# Patient Record
Sex: MALE | Race: OTHER | URBAN - METROPOLITAN AREA
[De-identification: names, ages, dates, MRNs, and addresses within clinical notes are randomized per-mention and may not be internally consistent; named-entity substitution may affect disease eponyms.]

---

## 2019-07-31 ENCOUNTER — OUTPATIENT (OUTPATIENT)
Dept: OUTPATIENT SERVICES | Facility: HOSPITAL | Age: 44
LOS: 1 days | Discharge: HOME | End: 2019-07-31
Payer: OTHER MISCELLANEOUS

## 2019-07-31 DIAGNOSIS — M54.5 LOW BACK PAIN: ICD-10-CM

## 2019-07-31 DIAGNOSIS — M54.2 CERVICALGIA: ICD-10-CM

## 2019-07-31 PROCEDURE — 72148 MRI LUMBAR SPINE W/O DYE: CPT | Mod: 26

## 2019-07-31 PROCEDURE — 72141 MRI NECK SPINE W/O DYE: CPT | Mod: 26

## 2022-07-05 ENCOUNTER — APPOINTMENT (OUTPATIENT)
Dept: PAIN MANAGEMENT | Facility: CLINIC | Age: 47
End: 2022-07-05

## 2022-07-05 PROBLEM — Z00.00 ENCOUNTER FOR PREVENTIVE HEALTH EXAMINATION: Status: ACTIVE | Noted: 2022-07-05

## 2023-04-22 ENCOUNTER — APPOINTMENT (OUTPATIENT)
Dept: ORTHOPEDIC SURGERY | Facility: CLINIC | Age: 48
End: 2023-04-22
Payer: COMMERCIAL

## 2023-04-22 VITALS — WEIGHT: 250 LBS | HEIGHT: 76 IN | BODY MASS INDEX: 30.44 KG/M2

## 2023-04-22 PROCEDURE — 99203 OFFICE O/P NEW LOW 30 MIN: CPT | Mod: 25

## 2023-04-22 PROCEDURE — 73562 X-RAY EXAM OF KNEE 3: CPT | Mod: LT

## 2023-04-22 PROCEDURE — 20610 DRAIN/INJ JOINT/BURSA W/O US: CPT | Mod: LT

## 2023-04-22 NOTE — HISTORY OF PRESENT ILLNESS
[de-identified] : 48-year-old male here for an evaluation of injury sustained to the left knee, patient states that this injury happened in April 13, 2023, patient states that he believes that this injury happened after playing handball, he realized that after the game he could not walk he been having significant pain and swelling over the left knee.\par The pain at rest is 6/10 the pain with activity is 10/10.

## 2023-04-22 NOTE — IMAGING
[de-identified] : Examination of the left knee, patient has significant joint effusion, patient has generalized pain, pain with ambulation.\par Appears to have negative anterior drawer, patient has pain with any provocative test of the knee.\par Positive Jo's, positive apprehension.\par Unable to fully bend his knee, extensor mechanism intact.\par Neurovascular intact\par \par X-ray of the left knee was done in office today, this x-ray was negative for any acute fracture or dislocation, some degenerative changes, moderate joint effusion.

## 2023-04-22 NOTE — DISCUSSION/SUMMARY
[de-identified] : Impression: Left knee injury possible meniscal tear\par \par Plan: Patient was advised for a knee aspiration, patient agrees, 65 cc of clear synovial fluid was aspirated, after knee aspiration and cortisone injection was given to the left knee, patient tolerated well.  \par Patient was advised for an MRI of the left knee.\par We discussed anti-inflammatory, prescription was sent to the pharmacy.\par Patient was advised to follow-up in about 4 to 6 weeks.\par \par Follow-up: 4 to 6 weeks\par \par Supervising physician Dr. Rivera

## 2023-04-22 NOTE — PROCEDURE
[Large Joint Injection] : Large joint injection [Left] : of the left [Knee] : knee [Pain] : pain [Alcohol] : alcohol [Betadine] : betadine [Ethyl Chloride sprayed topically] : ethyl chloride sprayed topically [Sterile technique used] : sterile technique used [___ cc    1%] : Lidocaine ~Vcc of 1%  [___ cc    4mg] : Dexamethasone (Decadron) ~Vcc of 4 mg  [Effusion] : effusion [] : Patient tolerated procedure well [de-identified] : 65 cc [de-identified] : Clear synovial

## 2023-05-05 ENCOUNTER — APPOINTMENT (OUTPATIENT)
Dept: MRI IMAGING | Facility: CLINIC | Age: 48
End: 2023-05-05
Payer: COMMERCIAL

## 2023-05-05 PROCEDURE — 73721 MRI JNT OF LWR EXTRE W/O DYE: CPT | Mod: LT

## 2023-05-26 ENCOUNTER — APPOINTMENT (OUTPATIENT)
Dept: ORTHOPEDIC SURGERY | Facility: CLINIC | Age: 48
End: 2023-05-26
Payer: COMMERCIAL

## 2023-05-26 DIAGNOSIS — S89.92XA UNSPECIFIED INJURY OF LEFT LOWER LEG, INITIAL ENCOUNTER: ICD-10-CM

## 2023-05-26 PROCEDURE — 99213 OFFICE O/P EST LOW 20 MIN: CPT

## 2023-05-26 NOTE — IMAGING
[de-identified] : On examination of the left knee, patient has moderate joint effusion, tenderness over the MCL.\par There is mild laxity when stressing the medial collateral ligament.\par No tenderness over the posterior aspect of the knee.\par Patient has tenderness over the suprapatella bursa.\par Patient has decreased range of motion to the knee possibly due to effusion.\par Patient is able to do active straight leg raise.\par Good motor strength with knee flexion extension.\par Normal gait.\par Calf is soft, nontender.\par Neurovascular intact.\par \par MRI of the left knee shows MCL sprain, semimembranosus bursitis, joint effusion and synovitis.

## 2023-05-26 NOTE — DISCUSSION/SUMMARY
[de-identified] : Impression: Left knee injury.  MCL sprain, semimembranosus bursitis.  Joint effusion\par \par Plan:\par I offered another knee aspiration, patient kindly declined this time.\par Patient was strongly advised for the use of a knee brace.  Patient was advised for physical therapy for strengthening of the knee.\par Patient was advised for ice therapy to try to help him to reduce the fluid on the knee.\par We discussed medication, prescription for diclofenac was sent to the pharmacy.\par Patient was advised that if the pain persists and if he develops a larger knee effusion she can give me a call make an appointment for a knee aspiration, patient agrees.\par Patient was advised to follow-up on as per needed basis\par \par Follow-up: As needed\par \par

## 2023-05-26 NOTE — HISTORY OF PRESENT ILLNESS
[de-identified] : 48-year-old male here for a repeat evaluation of injury sustained to the left knee, patient states that this injury happened in April 13, 2023, patient states that he believes that this injury happened after playing handball.\par Patient was seen by me last April 22, at that time patient was advised for a knee aspiration and cortisone injection as well as an MRI.\par Patient does admits to moderate improvement of symptoms, but she still has significant pain on his knee especially with flexion.\par Patient states that he has instability about the knee.

## 2023-06-22 ENCOUNTER — RX RENEWAL (OUTPATIENT)
Age: 48
End: 2023-06-22

## 2023-06-22 RX ORDER — DICLOFENAC SODIUM 75 MG/1
75 TABLET, DELAYED RELEASE ORAL
Qty: 60 | Refills: 0 | Status: ACTIVE | COMMUNITY
Start: 2023-04-22 | End: 1900-01-01

## 2023-10-25 ENCOUNTER — APPOINTMENT (OUTPATIENT)
Dept: PAIN MANAGEMENT | Facility: CLINIC | Age: 48
End: 2023-10-25
Payer: COMMERCIAL

## 2023-10-25 VITALS
WEIGHT: 250 LBS | SYSTOLIC BLOOD PRESSURE: 122 MMHG | HEART RATE: 75 BPM | BODY MASS INDEX: 30.44 KG/M2 | DIASTOLIC BLOOD PRESSURE: 71 MMHG | HEIGHT: 76 IN

## 2023-10-25 DIAGNOSIS — G89.29 LOW BACK PAIN, UNSPECIFIED: ICD-10-CM

## 2023-10-25 DIAGNOSIS — M54.50 LOW BACK PAIN, UNSPECIFIED: ICD-10-CM

## 2023-10-25 PROCEDURE — 99214 OFFICE O/P EST MOD 30 MIN: CPT

## 2023-10-27 ENCOUNTER — OUTPATIENT (OUTPATIENT)
Dept: OUTPATIENT SERVICES | Facility: HOSPITAL | Age: 48
LOS: 1 days | End: 2023-10-27
Payer: COMMERCIAL

## 2023-10-27 DIAGNOSIS — M24.59 CONTRACTURE, OTHER SPECIFIED JOINT: ICD-10-CM

## 2023-10-27 PROCEDURE — 72110 X-RAY EXAM L-2 SPINE 4/>VWS: CPT

## 2023-10-27 PROCEDURE — 72110 X-RAY EXAM L-2 SPINE 4/>VWS: CPT | Mod: 26

## 2023-10-28 DIAGNOSIS — M24.59 CONTRACTURE, OTHER SPECIFIED JOINT: ICD-10-CM

## 2023-12-06 ENCOUNTER — APPOINTMENT (OUTPATIENT)
Dept: PAIN MANAGEMENT | Facility: CLINIC | Age: 48
End: 2023-12-06

## 2024-07-29 ENCOUNTER — OUTPATIENT (OUTPATIENT)
Dept: OUTPATIENT SERVICES | Facility: HOSPITAL | Age: 49
LOS: 1 days | End: 2024-07-29
Payer: COMMERCIAL

## 2024-07-29 DIAGNOSIS — M54.9 DORSALGIA, UNSPECIFIED: ICD-10-CM

## 2024-07-29 PROCEDURE — 72170 X-RAY EXAM OF PELVIS: CPT | Mod: 26

## 2024-07-29 PROCEDURE — 72072 X-RAY EXAM THORAC SPINE 3VWS: CPT

## 2024-07-29 PROCEDURE — 72170 X-RAY EXAM OF PELVIS: CPT

## 2024-07-29 PROCEDURE — 72110 X-RAY EXAM L-2 SPINE 4/>VWS: CPT | Mod: 26

## 2024-07-29 PROCEDURE — 72072 X-RAY EXAM THORAC SPINE 3VWS: CPT | Mod: 26

## 2024-07-29 PROCEDURE — 72110 X-RAY EXAM L-2 SPINE 4/>VWS: CPT

## 2024-07-30 DIAGNOSIS — M54.9 DORSALGIA, UNSPECIFIED: ICD-10-CM

## 2025-03-14 ENCOUNTER — APPOINTMENT (OUTPATIENT)
Dept: UROLOGY | Facility: CLINIC | Age: 50
End: 2025-03-14

## 2025-07-15 ENCOUNTER — APPOINTMENT (OUTPATIENT)
Dept: RADIOLOGY | Facility: CLINIC | Age: 50
End: 2025-07-15

## 2025-07-15 ENCOUNTER — APPOINTMENT (OUTPATIENT)
Dept: PAIN MANAGEMENT | Facility: CLINIC | Age: 50
End: 2025-07-15
Payer: COMMERCIAL

## 2025-07-15 VITALS — WEIGHT: 250 LBS | HEIGHT: 74 IN | BODY MASS INDEX: 32.08 KG/M2

## 2025-07-15 PROCEDURE — 99203 OFFICE O/P NEW LOW 30 MIN: CPT

## 2025-07-15 PROCEDURE — 72110 X-RAY EXAM L-2 SPINE 4/>VWS: CPT

## 2025-08-14 ENCOUNTER — APPOINTMENT (OUTPATIENT)
Dept: PAIN MANAGEMENT | Facility: CLINIC | Age: 50
End: 2025-08-14